# Patient Record
Sex: FEMALE | Race: OTHER | HISPANIC OR LATINO | URBAN - METROPOLITAN AREA
[De-identification: names, ages, dates, MRNs, and addresses within clinical notes are randomized per-mention and may not be internally consistent; named-entity substitution may affect disease eponyms.]

---

## 2019-06-24 ENCOUNTER — EMERGENCY (EMERGENCY)
Facility: HOSPITAL | Age: 74
LOS: 1 days | Discharge: HOME | End: 2019-06-24
Admitting: EMERGENCY MEDICINE
Payer: MEDICARE

## 2019-06-24 VITALS
TEMPERATURE: 98 F | OXYGEN SATURATION: 96 % | SYSTOLIC BLOOD PRESSURE: 78 MMHG | DIASTOLIC BLOOD PRESSURE: 48 MMHG | RESPIRATION RATE: 16 BRPM | HEART RATE: 46 BPM

## 2019-06-24 VITALS
SYSTOLIC BLOOD PRESSURE: 129 MMHG | RESPIRATION RATE: 18 BRPM | HEART RATE: 74 BPM | OXYGEN SATURATION: 96 % | DIASTOLIC BLOOD PRESSURE: 66 MMHG | TEMPERATURE: 99 F
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
## 2019-06-24 DIAGNOSIS — S42.202A UNSPECIFIED FRACTURE OF UPPER END OF LEFT HUMERUS, INITIAL ENCOUNTER FOR CLOSED FRACTURE: ICD-10-CM

## 2019-06-24 DIAGNOSIS — Z23 ENCOUNTER FOR IMMUNIZATION: ICD-10-CM
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
## 2019-06-24 DIAGNOSIS — S00.511A ABRASION OF LIP, INITIAL ENCOUNTER: ICD-10-CM
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
## 2019-06-24 DIAGNOSIS — M25.512 PAIN IN LEFT SHOULDER: ICD-10-CM
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
## 2019-06-24 LAB
ALBUMIN SERPL ELPH-MCNC: 3.9 G/DL — SIGNIFICANT CHANGE UP (ref 3.4–5)
ALP SERPL-CCNC: 60 U/L — SIGNIFICANT CHANGE UP (ref 40–120)
ALT FLD-CCNC: 18 U/L — SIGNIFICANT CHANGE UP (ref 12–42)
ANION GAP SERPL CALC-SCNC: 9 MMOL/L — SIGNIFICANT CHANGE UP (ref 9–16)
AST SERPL-CCNC: 16 U/L — SIGNIFICANT CHANGE UP (ref 15–37)
BASOPHILS NFR BLD AUTO: 0.3 % — SIGNIFICANT CHANGE UP (ref 0–2)
BILIRUB SERPL-MCNC: 0.5 MG/DL — SIGNIFICANT CHANGE UP (ref 0.2–1.2)
BUN SERPL-MCNC: 14 MG/DL — SIGNIFICANT CHANGE UP (ref 7–23)
CALCIUM SERPL-MCNC: 9.8 MG/DL — SIGNIFICANT CHANGE UP (ref 8.5–10.5)
CHLORIDE SERPL-SCNC: 106 MMOL/L — SIGNIFICANT CHANGE UP (ref 96–108)
CO2 SERPL-SCNC: 29 MMOL/L — SIGNIFICANT CHANGE UP (ref 22–31)
CREAT SERPL-MCNC: 0.77 MG/DL — SIGNIFICANT CHANGE UP (ref 0.5–1.3)
EOSINOPHIL NFR BLD AUTO: 2.3 % — SIGNIFICANT CHANGE UP (ref 0–6)
GLUCOSE SERPL-MCNC: 121 MG/DL — HIGH (ref 70–99)
HCT VFR BLD CALC: 35.8 % — SIGNIFICANT CHANGE UP (ref 34.5–45)
HGB BLD-MCNC: 11.5 G/DL — SIGNIFICANT CHANGE UP (ref 11.5–15.5)
IMM GRANULOCYTES NFR BLD AUTO: 0.2 % — SIGNIFICANT CHANGE UP (ref 0–1.5)
LYMPHOCYTES # BLD AUTO: 56.3 % — HIGH (ref 13–44)
MAGNESIUM SERPL-MCNC: 2.2 MG/DL — SIGNIFICANT CHANGE UP (ref 1.6–2.6)
MCHC RBC-ENTMCNC: 26 PG — LOW (ref 27–34)
MCHC RBC-ENTMCNC: 32.1 G/DL — SIGNIFICANT CHANGE UP (ref 32–36)
MCV RBC AUTO: 81 FL — SIGNIFICANT CHANGE UP (ref 80–100)
MONOCYTES NFR BLD AUTO: 6.4 % — SIGNIFICANT CHANGE UP (ref 2–14)
NEUTROPHILS NFR BLD AUTO: 34.5 % — LOW (ref 43–77)
PLATELET # BLD AUTO: 237 K/UL — SIGNIFICANT CHANGE UP (ref 150–400)
POTASSIUM SERPL-MCNC: 4 MMOL/L — SIGNIFICANT CHANGE UP (ref 3.5–5.3)
POTASSIUM SERPL-SCNC: 4 MMOL/L — SIGNIFICANT CHANGE UP (ref 3.5–5.3)
PROT SERPL-MCNC: 7 G/DL — SIGNIFICANT CHANGE UP (ref 6.4–8.2)
RBC # BLD: 4.42 M/UL — SIGNIFICANT CHANGE UP (ref 3.8–5.2)
RBC # FLD: 14.5 % — SIGNIFICANT CHANGE UP (ref 10.3–14.5)
SODIUM SERPL-SCNC: 144 MMOL/L — SIGNIFICANT CHANGE UP (ref 132–145)
TROPONIN I SERPL-MCNC: <0.017 NG/ML — LOW (ref 0.02–0.06)
WBC # BLD: 5.8 K/UL — SIGNIFICANT CHANGE UP (ref 3.8–10.5)
WBC # FLD AUTO: 5.8 K/UL — SIGNIFICANT CHANGE UP (ref 3.8–10.5)

## 2019-06-24 PROCEDURE — 73060 X-RAY EXAM OF HUMERUS: CPT | Mod: 26,LT

## 2019-06-24 PROCEDURE — 71045 X-RAY EXAM CHEST 1 VIEW: CPT | Mod: 26

## 2019-06-24 PROCEDURE — 93010 ELECTROCARDIOGRAM REPORT: CPT | Mod: 59

## 2019-06-24 PROCEDURE — 70450 CT HEAD/BRAIN W/O DYE: CPT | Mod: 26

## 2019-06-24 PROCEDURE — 73030 X-RAY EXAM OF SHOULDER: CPT | Mod: 26,LT

## 2019-06-24 PROCEDURE — 99284 EMERGENCY DEPT VISIT MOD MDM: CPT | Mod: 25,57

## 2019-06-24 PROCEDURE — 73200 CT UPPER EXTREMITY W/O DYE: CPT | Mod: 26,LT

## 2019-06-24 PROCEDURE — 70486 CT MAXILLOFACIAL W/O DYE: CPT | Mod: 26

## 2019-06-24 PROCEDURE — 23600 CLTX PROX HUMRL FX W/O MNPJ: CPT | Mod: 54

## 2019-06-24 RX ORDER — TETANUS TOXOID, REDUCED DIPHTHERIA TOXOID AND ACELLULAR PERTUSSIS VACCINE, ADSORBED 5; 2.5; 8; 8; 2.5 [IU]/.5ML; [IU]/.5ML; UG/.5ML; UG/.5ML; UG/.5ML
0.5 SUSPENSION INTRAMUSCULAR ONCE
Refills: 0 | Status: COMPLETED | OUTPATIENT
Start: 2019-06-24 | End: 2019-06-24

## 2019-06-24 RX ORDER — IBUPROFEN 200 MG
1 TABLET ORAL
Qty: 20 | Refills: 0
Start: 2019-06-24

## 2019-06-24 RX ORDER — IBUPROFEN 200 MG
600 TABLET ORAL ONCE
Refills: 0 | Status: COMPLETED | OUTPATIENT
Start: 2019-06-24 | End: 2019-06-24

## 2019-06-24 RX ORDER — SODIUM CHLORIDE 9 MG/ML
1000 INJECTION INTRAMUSCULAR; INTRAVENOUS; SUBCUTANEOUS ONCE
Refills: 0 | Status: COMPLETED | OUTPATIENT
Start: 2019-06-24 | End: 2019-06-24

## 2019-06-24 RX ORDER — OXYCODONE AND ACETAMINOPHEN 5; 325 MG/1; MG/1
1 TABLET ORAL ONCE
Refills: 0 | Status: DISCONTINUED | OUTPATIENT
Start: 2019-06-24 | End: 2019-06-24

## 2019-06-24 RX ORDER — MORPHINE SULFATE 50 MG/1
2 CAPSULE, EXTENDED RELEASE ORAL ONCE
Refills: 0 | Status: DISCONTINUED | OUTPATIENT
Start: 2019-06-24 | End: 2019-06-24

## 2019-06-24 RX ADMIN — OXYCODONE AND ACETAMINOPHEN 1 TABLET(S): 5; 325 TABLET ORAL at 20:25

## 2019-06-24 RX ADMIN — MORPHINE SULFATE 2 MILLIGRAM(S): 50 CAPSULE, EXTENDED RELEASE ORAL at 14:56

## 2019-06-24 RX ADMIN — Medication 600 MILLIGRAM(S): at 20:26

## 2019-06-24 RX ADMIN — SODIUM CHLORIDE 1000 MILLILITER(S): 9 INJECTION INTRAMUSCULAR; INTRAVENOUS; SUBCUTANEOUS at 14:13

## 2019-06-24 RX ADMIN — MORPHINE SULFATE 2 MILLIGRAM(S): 50 CAPSULE, EXTENDED RELEASE ORAL at 20:26

## 2019-06-24 RX ADMIN — Medication 600 MILLIGRAM(S): at 20:25

## 2019-06-24 RX ADMIN — TETANUS TOXOID, REDUCED DIPHTHERIA TOXOID AND ACELLULAR PERTUSSIS VACCINE, ADSORBED 0.5 MILLILITER(S): 5; 2.5; 8; 8; 2.5 SUSPENSION INTRAMUSCULAR at 14:13

## 2019-06-24 RX ADMIN — OXYCODONE AND ACETAMINOPHEN 1 TABLET(S): 5; 325 TABLET ORAL at 20:26

## 2019-06-24 NOTE — ED PROVIDER NOTE - PHYSICAL EXAMINATION
VITAL SIGNS: I have reviewed nursing notes and confirm.  CONSTITUTIONAL: Well-developed; well-nourished; in no acute distress.  SKIN: Skin is warm and dry, no acute rash.  HEAD: Normocephalic; atraumatic.  EYES: PERRL, EOM intact; conjunctiva and sclera clear.  ENT: No nasal discharge; airway clear.  NECK: Supple; non tender.  CARD: S1, S2 normal; no murmurs, gallops, or rubs. Regular rate and rhythm.  RESP: No wheezes, rales or rhonchi.  ABD: Normal bowel sounds; soft; non-distended; non-tender; no hepatosplenomegaly.  EXT: +Swelling to left shoulder. Unable to range shoulder. Soft compartments. No elbow, wrist, or forearm tenderness. Radial pulses 2+.   NEURO: Alert, oriented. Grossly unremarkable.  PSYCH: Cooperative, appropriate. VITAL SIGNS: I have reviewed nursing notes and confirm.  CONSTITUTIONAL: Well-developed; well-nourished; in no acute distress.  SKIN: Skin is warm and dry, no acute rash.  HEAD: Normocephalic; atraumatic.  FacE: +abrasions to upper lip  EYES: PERRL, EOM intact; conjunctiva and sclera clear.  ENT: No nasal discharge; airway clear.  NECK: Supple; non tender. no midline tenderness   CARD: S1, S2 normal; no murmurs, gallops, or rubs. Regular rate and rhythm.  RESP: No wheezes, rales or rhonchi.  ABD: Normal bowel sounds; soft; non-distended; non-tender  EXT: LUE: +Swelling to left shoulder. Unable to range shoulder. Soft compartments. No elbow, wrist, or forearm tenderness. Radial pulses 2+.   Ambulatory  NEURO: Alert, oriented. Grossly unremarkable.  PSYCH: Cooperative, appropriate.

## 2019-06-24 NOTE — ED PROVIDER NOTE - CARE PROVIDER_API CALL
Deondre Bailon)  Orthopaedic Surgery  [address] [place] [address]  Phone: [phone]  Fax: [phone]  Follow Up Time:

## 2019-06-24 NOTE — ED PROVIDER NOTE - CLINICAL SUMMARY MEDICAL DECISION MAKING FREE TEXT BOX
s/p mechanical fall, c/o left shoulder pain. arrived to ED c/o nausea, dizziness, HR 50s, BP systolic 90s, probable vagal tone from pain, improved. xrays show proximal humerus fracture, no dislocation, nvi, closed fracture. spoke with ortho on call dr. marcial and discussed case, he requested CT shoulder be obtained and will have his team reach out to patient for possible surgical repair, discussed this with patient/daughter, they agree with plan, will d/c home with sling, pain meds, f/u ortho, return precautions discussed.

## 2019-06-24 NOTE — ED ADULT NURSE NOTE - OBJECTIVE STATEMENT
Pt walked into ED c/o left shoulder pain and lightheadedness s/p mechanical fall x30min PTA. Pt reports she tripped over her own foot and landed face first, no LOC, no use of blood thinners. Pt presents with abrasion to the top lip, last tdap unknown, incontinent of urine, limited ROM to the LUE due to pain.

## 2019-06-24 NOTE — ED ADULT TRIAGE NOTE - CHIEF COMPLAINT QUOTE
Mechanical trip and fall on face. Denies LOC. Reports left shoulder pain. Abrasion to right side of nose. Denies blood thinners.

## 2019-06-24 NOTE — ED PROVIDER NOTE - NSFOLLOWUPINSTRUCTIONS_ED_ALL_ED_FT
You have a left shoulder fracture that may need surgery  Please follow up with Dr. Bailon within 2-3 days  Wear sling.   Take pain medications as prescribed  Take Motrin with food  Percocet can make you drowsy, sleepy and also constipated - increase fiber intake.     RETURN TO THE EMERGENCY DEPARTMENT FOR WORSENING PAIN, NUMBNESS, WEAKNESS, OR ANY CONCERNING OR WORSENING SYMPTOMS.

## 2019-06-24 NOTE — ED ADULT NURSE NOTE - NSIMPLEMENTINTERV_GEN_ALL_ED
Implemented All Fall Risk Interventions:  [facility] to call system. Call bell, personal items and telephone within reach. Instruct patient to call for assistance. Room bathroom lighting operational. Non-slip footwear when patient is off stretcher. Physically safe environment: no spills, clutter or unnecessary equipment. Stretcher in lowest position, wheels locked, appropriate side rails in place. Provide visual cue, wrist band, yellow gown, etc. Monitor gait and stability. Monitor for mental status changes and reorient to person, place, and time. Review medications for side effects contributing to fall risk. Reinforce activity limits and safety measures with patient and family.

## 2019-06-24 NOTE — ED PROVIDER NOTE - OBJECTIVE STATEMENT
72 y/o female with PMHx of Parkinson, accompanied by friend, states that she tripped and fell outside in the street and fell onto her left shoulder. She is c/o left shoulder pain. Unable to move left shoulder. She is also c/o bruising to upper lip. No LOC. No nausea, vomiting, headache, neck pain, back pain. Tetanus unknown. 74 y/o female with PMHx of Parkinsons, accompanied by friend, s/p tripped and fell outside in the street and fell onto her left shoulder. c/o left shoulder pain. Unable to move left shoulder. c/o bruising to upper lip. No LOC. No nausea, vomiting, headache, neck pain, back pain. Tetanus unknown.     Patient walked to ED accompanied by friend - upon arrival to ED, she is c/o dizziness/lightheadedness, BP 90s systolic, HR 50s.

## 2019-06-24 NOTE — ED ADULT NURSE NOTE - NSFALLRSKPASTHIST_ED_ALL_ED
[>50% of Time Spent on Counseling for ____] : Greater than 50% of the encounter time was spent on counseling for [unfilled] [Time Spent: ___ minutes] : I have spent [unfilled] minutes of face to face time with the patient yes

## 2019-06-24 NOTE — ED PROVIDER NOTE - DIAGNOSTIC INTERPRETATION
Interpreted by ED STEPHANIE Anand  chest x-ray 1 view  Lungs clear, heart shadow normal, bony structures normal, no free air under diaphragm, no PTX   left shoulder x-ray 3 views  proximal humeral fracture, no dislocation (joint spaces grossly normal), no Foreign Body noted, soft tissue swelling  left humerus xrays 2 views  humerus fracture, no dislocation (joint spaces grossly normal), no Foreign Body noted, soft tissue swelling

## 2019-06-26 PROBLEM — G20 PARKINSON'S DISEASE: Chronic | Status: ACTIVE | Noted: 2019-06-24

## 2019-06-27 ENCOUNTER — INPATIENT (INPATIENT)
Facility: HOSPITAL | Age: 74
LOS: 0 days | Discharge: HOME | DRG: 494 | End: 2019-06-28
Attending: ORTHOPAEDIC SURGERY | Admitting: ORTHOPAEDIC SURGERY
Payer: COMMERCIAL

## 2019-06-27 ENCOUNTER — OUTPATIENT (OUTPATIENT)
Dept: OUTPATIENT SERVICES | Facility: HOSPITAL | Age: 74
LOS: 1 days | Discharge: HOME | End: 2019-06-27

## 2019-06-27 VITALS
TEMPERATURE: 98 F | SYSTOLIC BLOOD PRESSURE: 105 MMHG | DIASTOLIC BLOOD PRESSURE: 55 MMHG | RESPIRATION RATE: 14 BRPM | HEART RATE: 76 BPM | OXYGEN SATURATION: 99 %

## 2019-06-27 DIAGNOSIS — K80.10 CALCULUS OF GALLBLADDER WITH CHRONIC CHOLECYSTITIS WITHOUT OBSTRUCTION: Chronic | ICD-10-CM

## 2019-06-27 DIAGNOSIS — G20 PARKINSON'S DISEASE: ICD-10-CM

## 2019-06-27 DIAGNOSIS — Z98.890 OTHER SPECIFIED POSTPROCEDURAL STATES: Chronic | ICD-10-CM

## 2019-06-27 DIAGNOSIS — S42.209A UNSPECIFIED FRACTURE OF UPPER END OF UNSPECIFIED HUMERUS, INITIAL ENCOUNTER FOR CLOSED FRACTURE: ICD-10-CM

## 2019-06-27 DIAGNOSIS — Z90.710 ACQUIRED ABSENCE OF BOTH CERVIX AND UTERUS: Chronic | ICD-10-CM

## 2019-06-27 DIAGNOSIS — S42.232A 3-PART FRACTURE OF SURGICAL NECK OF LEFT HUMERUS, INITIAL ENCOUNTER FOR CLOSED FRACTURE: ICD-10-CM

## 2019-06-27 DIAGNOSIS — Z79.1 LONG TERM (CURRENT) USE OF NON-STEROIDAL ANTI-INFLAMMATORIES (NSAID): ICD-10-CM

## 2019-06-27 DIAGNOSIS — Z79.891 LONG TERM (CURRENT) USE OF OPIATE ANALGESIC: ICD-10-CM
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
## 2019-06-27 RX ORDER — ASPIRIN/CALCIUM CARB/MAGNESIUM 324 MG
325 TABLET ORAL DAILY
Refills: 0 | Status: DISCONTINUED | OUTPATIENT
Start: 2019-06-27 | End: 2019-06-28

## 2019-06-27 RX ORDER — MORPHINE SULFATE 50 MG/1
4 CAPSULE, EXTENDED RELEASE ORAL EVERY 4 HOURS
Refills: 0 | Status: DISCONTINUED | OUTPATIENT
Start: 2019-06-27 | End: 2019-06-28

## 2019-06-27 RX ORDER — OXYCODONE HYDROCHLORIDE 5 MG/1
5 TABLET ORAL EVERY 6 HOURS
Refills: 0 | Status: DISCONTINUED | OUTPATIENT
Start: 2019-06-27 | End: 2019-06-28

## 2019-06-27 RX ORDER — CEFAZOLIN SODIUM 1 G
2000 VIAL (EA) INJECTION EVERY 8 HOURS
Refills: 0 | Status: COMPLETED | OUTPATIENT
Start: 2019-06-28 | End: 2019-06-28

## 2019-06-27 RX ORDER — ONDANSETRON 8 MG/1
4 TABLET, FILM COATED ORAL EVERY 4 HOURS
Refills: 0 | Status: DISCONTINUED | OUTPATIENT
Start: 2019-06-27 | End: 2019-06-28

## 2019-06-27 RX ORDER — SODIUM CHLORIDE 9 MG/ML
1000 INJECTION, SOLUTION INTRAVENOUS
Refills: 0 | Status: DISCONTINUED | OUTPATIENT
Start: 2019-06-27 | End: 2019-06-28

## 2019-06-27 RX ORDER — DOCUSATE SODIUM 100 MG
100 CAPSULE ORAL THREE TIMES A DAY
Refills: 0 | Status: DISCONTINUED | OUTPATIENT
Start: 2019-06-27 | End: 2019-06-28

## 2019-06-27 RX ORDER — MORPHINE SULFATE 50 MG/1
4 CAPSULE, EXTENDED RELEASE ORAL
Refills: 0 | Status: DISCONTINUED | OUTPATIENT
Start: 2019-06-27 | End: 2019-06-28

## 2019-06-27 RX ADMIN — Medication 100 MILLIGRAM(S): at 22:35

## 2019-06-27 RX ADMIN — SODIUM CHLORIDE 125 MILLILITER(S): 9 INJECTION, SOLUTION INTRAVENOUS at 21:21

## 2019-06-27 NOTE — H&P ADULT - NSHPPHYSICALEXAM_GEN_ALL_CORE
MSK:  decreased range of motion left shoulder 2/2 pain  Remainder of physical exam as per medical clearance note NAD  MSK Left UE:  decreased range of motion left shoulder 2/2 pain  +ecchymosis left UE  radial palpbale  neuromotor intact distally, RAD/MED/PIN/AIN intact  gross sensation intact to light touch b/l UE  +tremor left UE  able to extend left wrist  Remainder of physical exam as per medical clearance note

## 2019-06-27 NOTE — PACU DISCHARGE NOTE - COMMENTS
Patient discharged from PACU after meeting criteia. Report given to 8W CATE Vega. VSS. RRWNL on 3L NC. O2 sats 98%. CM showing SR. Right hand HL 18g with LR at 125cc/h. Abd benign. Pt due to void. Pt tolerating sips of water with meds. Pt transported on bed by transport team. Pt sent on bed with 1 bag of belongings and container with teeth. Pt pain free

## 2019-06-27 NOTE — H&P ADULT - PROBLEM SELECTOR PLAN 1
Admit to Orthopaedic Service.  Presents today for elective left humerus ORIF   Pt medically stable and cleared for procedure today by  Admit to Orthopaedic Service.  Presents today for elective left humerus ORIF

## 2019-06-27 NOTE — H&P ADULT - HISTORY OF PRESENT ILLNESS
73 year old female presents with left shoulder pain x   presents today for elective left humerus ORIF 73 year old female presents with left shoulder pain x few days. S/p mechanical fall - sustained left humerus fracture, seen at [facility]. No head trauma or LOC.    Presents today for elective left humerus ORIF.  right hand dominant. No numbness of Left UE. NO fever, chills, or recent illness. No CP or SOB.   Hx parkinson's with UE remor

## 2019-06-28 ENCOUNTER — TRANSCRIPTION ENCOUNTER (OUTPATIENT)
Age: 74
End: 2019-06-28

## 2019-06-28 VITALS
OXYGEN SATURATION: 98 % | SYSTOLIC BLOOD PRESSURE: 116 MMHG | HEART RATE: 74 BPM | RESPIRATION RATE: 16 BRPM | DIASTOLIC BLOOD PRESSURE: 68 MMHG | TEMPERATURE: 98 F

## 2019-06-28 RX ORDER — ASPIRIN/CALCIUM CARB/MAGNESIUM 324 MG
1 TABLET ORAL
Qty: 30 | Refills: 0
Start: 2019-06-28 | End: 2019-07-27

## 2019-06-28 RX ORDER — ACETAMINOPHEN 500 MG
1 TABLET ORAL
Qty: 90 | Refills: 0
Start: 2019-06-28 | End: 2019-07-27

## 2019-06-28 RX ORDER — OXYCODONE HYDROCHLORIDE 5 MG/1
10 TABLET ORAL EVERY 4 HOURS
Refills: 0 | Status: DISCONTINUED | OUTPATIENT
Start: 2019-06-28 | End: 2019-06-28

## 2019-06-28 RX ORDER — OXYCODONE HYDROCHLORIDE 5 MG/1
5 TABLET ORAL EVERY 4 HOURS
Refills: 0 | Status: DISCONTINUED | OUTPATIENT
Start: 2019-06-28 | End: 2019-06-28

## 2019-06-28 RX ORDER — OXYCODONE HYDROCHLORIDE 5 MG/1
1 TABLET ORAL
Qty: 10 | Refills: 0
Start: 2019-06-28

## 2019-06-28 RX ORDER — ACETAMINOPHEN 500 MG
975 TABLET ORAL EVERY 8 HOURS
Refills: 0 | Status: DISCONTINUED | OUTPATIENT
Start: 2019-06-28 | End: 2019-06-28

## 2019-06-28 RX ADMIN — OXYCODONE HYDROCHLORIDE 5 MILLIGRAM(S): 5 TABLET ORAL at 04:12

## 2019-06-28 RX ADMIN — Medication 2000 MILLIGRAM(S): at 07:08

## 2019-06-28 RX ADMIN — Medication 2000 MILLIGRAM(S): at 00:51

## 2019-06-28 RX ADMIN — OXYCODONE HYDROCHLORIDE 5 MILLIGRAM(S): 5 TABLET ORAL at 04:47

## 2019-06-28 RX ADMIN — Medication 100 MILLIGRAM(S): at 07:08

## 2019-06-28 NOTE — DISCHARGE NOTE PROVIDER - NSDCCPCAREPLAN_GEN_ALL_CORE_FT
PRINCIPAL DISCHARGE DIAGNOSIS  Diagnosis: Fracture of proximal humerus  Assessment and Plan of Treatment: improvement s/p Left proximal humerus ORIF

## 2019-06-28 NOTE — DISCHARGE NOTE PROVIDER - NSDCFUADDINST_GEN_ALL_CORE_FT
NON weight bearing Left arm with cane.  Wear sling at all times except to change clothes and shower. Keep arm at side.  No strenuous activity, heavy lifting, driving or returning to work until cleared by MD.  You may shower - dressing is water-resistant, no soaking in bathtubs.  Remove dressing after post op day 5-7, then leave incision open to air. Keep incision clean and dry.  Try to have regular bowel movements, take stool softener or laxative if necessary.  May take Pepcid or Zantac for upset stomach.  Swelling may travel all the way down arm to hand, this is normal and will subside in a few weeks.  Call to schedule an appt with Dr. Bailon for follow up, if you have staples or sutures they will be removed in office.  Contact your doctor if you experience: fever greater than 101.5, chills, chest pain, difficulty breathing, redness or excessive drainage around the incision, other concerns.  Follow up with your primary care provider.

## 2019-06-28 NOTE — OCCUPATIONAL THERAPY INITIAL EVALUATION ADULT - ADDITIONAL COMMENTS
Pt reports she lives with her daughter who helps take care of her. Daughter is currently on vacation and adopted son will help while she is away. Pt has baseline Parkinson's and owns a SC.

## 2019-06-28 NOTE — PROGRESS NOTE ADULT - SUBJECTIVE AND OBJECTIVE BOX
Orthopaedics Post Op Check    Procedure:L humerus ORIF  Surgeon: Adamaris    Pt comfortable, without complaints  Denies CP, SOB, N/V, numbness/tingling     Vital Signs Last 24 Hrs  T(C): 36.7 (27 Jun 2019 22:55), Max: 37.2 (27 Jun 2019 13:32)  T(F): 98 (27 Jun 2019 22:55), Max: 98.9 (27 Jun 2019 13:32)  HR: 76 (27 Jun 2019 22:55) (72 - 79)  BP: 103/57 (27 Jun 2019 22:55) (95/49 - 122/73)  BP(mean): 76 (27 Jun 2019 22:55) (64 - 76)  RR: 17 (27 Jun 2019 22:55) (13 - 18)  SpO2: 96% (27 Jun 2019 22:55) (96% - 99%)      AVSS, NAD  General: Pt Alert and oriented       LUE  Dressing: C/D/I  Motor: wiggles fingers, flex/extends wrist  Sensation: SILT med/uln/rad/musc/axillary   Pulses:  rad/brach 2+ , fingers/hand WWP            Post op XR: Well positioned components    A/P: 73yFemale POD#0 s/p above procedure, doing well  - Stable  - Pain Control  - DVT ppx:  BID / SCDs  - Paula op abx: ancef  - PT, WBS: nwb lue  - F/U AM Labs

## 2019-06-28 NOTE — DISCHARGE NOTE PROVIDER - CARE PROVIDER_API CALL
Deondre Bailon)  Orthopaedic Surgery; Sports Medicine  [address] [place] [address]  Phone: [phone]  Fax: [phone]  Follow Up Time: 2 weeks

## 2019-06-28 NOTE — DISCHARGE NOTE PROVIDER - HOSPITAL COURSE
Admitted    Surgery 6/28 Left proximal humerus fx    Paula-op Antibiotics    Pain control    DVT prophylaxis    OOB/Occupational Therapy

## 2019-06-28 NOTE — OCCUPATIONAL THERAPY INITIAL EVALUATION ADULT - RANGE OF MOTION EXAMINATION, UPPER EXTREMITY
LUE not tested 2/2 to NWB; R digits/wrist full ROM/Right UE Active ROM was WFL (within functional limits)

## 2019-06-28 NOTE — DISCHARGE NOTE PROVIDER - NSDCACTIVITY_GEN_ALL_CORE
Do not drive or operate machinery/No heavy lifting/straining/Walking - Outdoors allowed/Walking - Indoors allowed/Stairs allowed/Showering allowed

## 2019-06-28 NOTE — DISCHARGE NOTE NURSING/CASE MANAGEMENT/SOCIAL WORK - NSDCDPATPORTLINK_GEN_ALL_CORE
You can access the [facility] Patient Portal, offered by [facility], by registering with the following website: [email or web address]

## 2019-06-28 NOTE — PROGRESS NOTE ADULT - SUBJECTIVE AND OBJECTIVE BOX
SUBJECTIVE: Patient seen and examined. Pain controlled.  Pt did well o/n  No f/c/n/v/cp/sob.     OBJECTIVE:  NAD  Vital Signs Last 24 Hrs  T(C): 35.7 (28 Jun 2019 00:10), Max: 37.2 (27 Jun 2019 13:32)  T(F): 96.3 (28 Jun 2019 00:10), Max: 98.9 (27 Jun 2019 13:32)  HR: 78 (28 Jun 2019 00:10) (72 - 79)  BP: 114/66 (28 Jun 2019 00:10) (95/49 - 122/73)  BP(mean): 76 (27 Jun 2019 22:55) (64 - 76)  RR: 16 (28 Jun 2019 00:10) (13 - 18)  SpO2: 97% (28 Jun 2019 00:10) (96% - 99%)    Affected extremity:          Dressing: clean/dry/intact           Motor: wiggles fingers, flex/extends wrist        Sensation: SILT med/uln/rad/musc/axillary         Pulses:  rad/brach 2+ , fingers/hand WWP          I&O's Detail    27 Jun 2019 07:01  -  28 Jun 2019 06:05  --------------------------------------------------------  IN:    lactated ringers.: 1250 mL    Oral Fluid: 60 mL  Total IN: 1310 mL    OUT:    Estimated Blood Loss: 150 mL    Voided: 300 mL  Total OUT: 450 mL    Total NET: 860 mL        A/P :  Pt is a 74yo Female s/p  L ORIF prox humerus 6/27  -    Pain control  -    DVT ppx: SCD/ASA    -    Weight bearing status: NWB LUE   -    Dispo: Home    Cristhian Harris MD  PGY -4  Orthopaedic Surgery

## 2019-07-01 PROCEDURE — 97161 PT EVAL LOW COMPLEX 20 MIN: CPT

## 2019-07-01 PROCEDURE — 84484 ASSAY OF TROPONIN QUANT: CPT

## 2019-07-01 PROCEDURE — 73060 X-RAY EXAM OF HUMERUS: CPT

## 2019-07-01 PROCEDURE — C1713: CPT

## 2019-07-01 PROCEDURE — 70450 CT HEAD/BRAIN W/O DYE: CPT

## 2019-07-01 PROCEDURE — 70486 CT MAXILLOFACIAL W/O DYE: CPT

## 2019-07-01 PROCEDURE — 83735 ASSAY OF MAGNESIUM: CPT

## 2019-07-01 PROCEDURE — 90715 TDAP VACCINE 7 YRS/> IM: CPT

## 2019-07-01 PROCEDURE — 73030 X-RAY EXAM OF SHOULDER: CPT

## 2019-07-01 PROCEDURE — 76000 FLUOROSCOPY <1 HR PHYS/QHP: CPT

## 2019-07-01 PROCEDURE — 36415 COLL VENOUS BLD VENIPUNCTURE: CPT

## 2019-07-01 PROCEDURE — 23615 OPTX PROX HUMRL FX W/INT FIX: CPT

## 2019-07-01 PROCEDURE — 73200 CT UPPER EXTREMITY W/O DYE: CPT

## 2019-07-01 PROCEDURE — 82962 GLUCOSE BLOOD TEST: CPT

## 2019-07-01 PROCEDURE — C1889: CPT

## 2019-07-01 PROCEDURE — 71045 X-RAY EXAM CHEST 1 VIEW: CPT

## 2019-07-01 PROCEDURE — 85025 COMPLETE CBC W/AUTO DIFF WBC: CPT

## 2019-07-01 PROCEDURE — 80053 COMPREHEN METABOLIC PANEL: CPT
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
## 2019-07-09 DIAGNOSIS — S42.212A UNSPECIFIED DISPLACED FRACTURE OF SURGICAL NECK OF LEFT HUMERUS, INITIAL ENCOUNTER FOR CLOSED FRACTURE: ICD-10-CM
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
## 2019-09-25 ENCOUNTER — APPOINTMENT (OUTPATIENT)
Dept: RADIOLOGY | Facility: CLINIC | Age: 74
End: 2019-09-25

## 2019-09-25 ENCOUNTER — OUTPATIENT (OUTPATIENT)
Dept: OUTPATIENT SERVICES | Facility: HOSPITAL | Age: 74
LOS: 1 days | End: 2019-09-25
Payer: MEDICARE

## 2019-09-25 DIAGNOSIS — Z90.710 ACQUIRED ABSENCE OF BOTH CERVIX AND UTERUS: Chronic | ICD-10-CM

## 2019-09-25 DIAGNOSIS — Z98.890 OTHER SPECIFIED POSTPROCEDURAL STATES: Chronic | ICD-10-CM

## 2019-09-25 DIAGNOSIS — K80.10 CALCULUS OF GALLBLADDER WITH CHRONIC CHOLECYSTITIS WITHOUT OBSTRUCTION: Chronic | ICD-10-CM

## 2019-09-25 PROBLEM — F41.9 ANXIETY DISORDER, UNSPECIFIED: Chronic | Status: ACTIVE | Noted: 2019-06-27

## 2019-09-25 PROCEDURE — 73030 X-RAY EXAM OF SHOULDER: CPT

## 2019-09-25 PROCEDURE — 73030 X-RAY EXAM OF SHOULDER: CPT | Mod: 26,LT

## 2020-09-02 ENCOUNTER — OUTPATIENT (OUTPATIENT)
Dept: OUTPATIENT SERVICES | Facility: HOSPITAL | Age: 75
LOS: 1 days | End: 2020-09-02

## 2020-09-02 ENCOUNTER — APPOINTMENT (OUTPATIENT)
Dept: RADIOLOGY | Facility: CLINIC | Age: 75
End: 2020-09-02
Payer: MEDICARE

## 2020-09-02 DIAGNOSIS — Z98.890 OTHER SPECIFIED POSTPROCEDURAL STATES: Chronic | ICD-10-CM

## 2020-09-02 DIAGNOSIS — Z90.710 ACQUIRED ABSENCE OF BOTH CERVIX AND UTERUS: Chronic | ICD-10-CM

## 2020-09-02 DIAGNOSIS — K80.10 CALCULUS OF GALLBLADDER WITH CHRONIC CHOLECYSTITIS WITHOUT OBSTRUCTION: Chronic | ICD-10-CM

## 2020-09-02 PROCEDURE — 73030 X-RAY EXAM OF SHOULDER: CPT | Mod: 26,LT

## 2020-09-15 PROBLEM — Z00.00 ENCOUNTER FOR PREVENTIVE HEALTH EXAMINATION: Status: ACTIVE | Noted: 2020-09-15

## 2020-09-21 ENCOUNTER — APPOINTMENT (OUTPATIENT)
Dept: PHYSICAL MEDICINE AND REHAB | Facility: CLINIC | Age: 75
End: 2020-09-21
Payer: MEDICARE

## 2020-09-21 VITALS — WEIGHT: 117 LBS | RESPIRATION RATE: 16 BRPM | OXYGEN SATURATION: 97 %

## 2020-09-21 DIAGNOSIS — M75.02 ADHESIVE CAPSULITIS OF LEFT SHOULDER: ICD-10-CM

## 2020-09-21 DIAGNOSIS — Z87.39 PERSONAL HISTORY OF OTHER DISEASES OF THE MUSCULOSKELETAL SYSTEM AND CONNECTIVE TISSUE: ICD-10-CM

## 2020-09-21 DIAGNOSIS — Z86.69 PERSONAL HISTORY OF OTHER DISEASES OF THE NERVOUS SYSTEM AND SENSE ORGANS: ICD-10-CM

## 2020-09-21 PROCEDURE — 99203 OFFICE O/P NEW LOW 30 MIN: CPT | Mod: 25

## 2020-09-21 PROCEDURE — 20611 DRAIN/INJ JOINT/BURSA W/US: CPT | Mod: LT

## 2020-09-24 PROBLEM — Z87.39 HISTORY OF ARTHRITIS: Status: RESOLVED | Noted: 2020-09-21 | Resolved: 2020-09-24

## 2020-09-24 PROBLEM — Z86.69 HISTORY OF PARKINSON'S DISEASE: Status: RESOLVED | Noted: 2020-09-21 | Resolved: 2020-09-24

## 2020-09-24 PROBLEM — M75.02 ADHESIVE CAPSULITIS OF LEFT SHOULDER: Status: ACTIVE | Noted: 2020-09-24

## 2022-01-19 ENCOUNTER — OUTPATIENT (OUTPATIENT)
Dept: OUTPATIENT SERVICES | Facility: HOSPITAL | Age: 77
LOS: 1 days | End: 2022-01-19
Payer: MEDICARE

## 2022-01-19 ENCOUNTER — APPOINTMENT (OUTPATIENT)
Dept: RADIOLOGY | Facility: CLINIC | Age: 77
End: 2022-01-19

## 2022-01-19 DIAGNOSIS — Z90.710 ACQUIRED ABSENCE OF BOTH CERVIX AND UTERUS: Chronic | ICD-10-CM

## 2022-01-19 DIAGNOSIS — K80.10 CALCULUS OF GALLBLADDER WITH CHRONIC CHOLECYSTITIS WITHOUT OBSTRUCTION: Chronic | ICD-10-CM

## 2022-01-19 DIAGNOSIS — Z98.890 OTHER SPECIFIED POSTPROCEDURAL STATES: Chronic | ICD-10-CM

## 2022-01-19 PROCEDURE — 73030 X-RAY EXAM OF SHOULDER: CPT | Mod: 26,LT

## 2022-04-01 ENCOUNTER — APPOINTMENT (OUTPATIENT)
Dept: PHYSICAL MEDICINE AND REHAB | Facility: CLINIC | Age: 77
End: 2022-04-01
Payer: MEDICARE

## 2022-04-01 VITALS — HEIGHT: 62 IN | BODY MASS INDEX: 20.24 KG/M2 | WEIGHT: 110 LBS

## 2022-04-01 DIAGNOSIS — M19.012 PRIMARY OSTEOARTHRITIS, LEFT SHOULDER: ICD-10-CM

## 2022-04-01 PROCEDURE — 99214 OFFICE O/P EST MOD 30 MIN: CPT

## 2022-04-01 NOTE — ASSESSMENT
[FreeTextEntry1] : Impression:\par 1. Left Glenohumeral DJD s/p ORIF\par \par Plan: After review of the history, physical examination, and imaging, the patient's symptoms are consistent with Progressive DJD of the shoulder. The imaging results and diagnosis were reviewed with the patient . We discussed all the potential treatment options including physical therapy, oral medication, ultrasound guided injections, and surgery; as well as alternative therapeutics such as acupuncture and massage. At this time the patient has been doing PT without any improvement. She is interested in repeat interventional options for symptom reduction; I offered [facility] CSI in the office today, please see procedure note for full detail. I am recommending that the patient proceed with gentle HEP alone and stop PT. We discussed the possibility of TSA in the future and she will consider this pending her response to CSI. The patient will return to the office for followup in 3-4 months. The patient expressed verbal understanding and is in agreement with the plan of care. All of the patient's questions and concerns were addressed during today's visit.

## 2022-04-01 NOTE — DATA REVIEWED
[FreeTextEntry1] : XR L Shoulder 1/2022: S/P ORIF of left proximal humerus. Moderate glenohumeral degenerative changes.

## 2022-04-01 NOTE — HISTORY OF PRESENT ILLNESS
[FreeTextEntry1] : Ms. THERESE HOLMAN is a very pleasant 75 year female who comes in for follow up of Left shoulder pain. She did well with CSI at her previous visit, and has been doing PT recently without any improvement. The pain is located primarily on her left shoulder intermittent in nature and described as consistent and achy. The pain is rated as 3/10 during today's visit, and ranges from 2-7/10. The patient's symptoms are aggravated by lifting her arm and alleviated by not lifting. The patient denies any night pain, numbness/tingling, weakness, or bowel/bladder dysfunction. The patient has no other complaints at this time.

## 2022-04-01 NOTE — PHYSICAL EXAM
[FreeTextEntry1] : GEN: AAOx3, NAD.\par GAIT: Non antalgic, normal reciprocating heel to toe.\par INSP: (+) Atrophy, No Bony Deformity, Swelling. Shoulder height symmetric. Normal Thoracic Kyphosis.\par Cervical ROM: Full and pain free.\par Shoulder AROM: Limited ABd/Flex/IR/ER Left. No crepitus. PROM IR/ER limited Left.\par PALP: No tenderness anterior, lateral, or posterior compartments.\par STRENGTH: 5/5 bilateral shoulder abductors, internal rotation, external rotation, elbow flexors, elbow extensors, wrist extensors, long finger flexors, hand intrinsics, .\par TONE: Normal, No clonus.\par SENSATION:  Normal to light touch in the bilateral upper extremities.\par REFLEXES: Symmetric biceps, triceps, brachioradialis, pronator teres. Palmer negative bilaterally.\par SPECIAL: Spurling's negative bilaterally.

## 2022-04-01 NOTE — PROCEDURE
[de-identified] : Procedure: Intra-articular LEFT Glenohumeral Injection with Ultrasound Guidance\par \par Findings: The LEFT shoulder was examined under ultrasound using a 10mHz linear array transducer. There was evidence of mild effusion.\par \par Injectate: 1mL Kenalog (40mg/mL) with 1mL 0.25% Bupivicaine and 2mL Lidocaine 1% without Epinephrine\par \par After risks, benefits and alternatives were discussed and the patient expressed understanding, informed consent was obtained. Risks include but are not limited to bleeding, infection, worsening pain, nerve damage, scar formation. \par \par Ultrasound was indicated for needle guidance, to ensure needle placement, and to assess for any effusions or vasculature in the path of the needle.\par \par The LEFT glenohumeral joint was identified by ultrasound, and probe location was marked on the skin. Ultrasound findings are noted above.  The overlying area was prepped and draped in a sterile fashion with Chloraprep.  After skin preparation, a 25 gauge needle was used to anesthetize the skin with 2 mL of 1% Lidocaine. There was no blood in the hub on aspiration prior to any medication delivery. Then a 22 gauge 1.5” needle was inserted into the glenohumeral joint under direct ultrasound guidance, using a posterior in-plane lateral to medial approach. Aspiration was negative for blood, and the above injectate was placed. A good capsular distension pattern was noted confirming intra-articular placement. Ultrasound images for needle placement verification were permanently stored for reference.\par \par At the conclusion of the procedure the needle was removed, the area was cleaned with alcohol and a band-aid was placed over the injection site. The patient tolerated the procedure well. There were no complications during or after the procedure. The patient reported improvement in symptoms following the procedure. Post-procedure care instructions and follow up appointment were given. If there are any complications, the patient was instructed to call the office.

## 2024-04-08 NOTE — DISCHARGE NOTE PROVIDER - NSDCDCMDCOMP_GEN_ALL_CORE
Quality 130: Documentation Of Current Medications In The Medical Record: Current Medications Documented
Quality 431: Preventive Care And Screening: Unhealthy Alcohol Use - Screening: Patient not identified as an unhealthy alcohol user when screened for unhealthy alcohol use using a systematic screening method
Detail Level: Detailed
Quality 226: Preventive Care And Screening: Tobacco Use: Screening And Cessation Intervention: Patient screened for tobacco use and is an ex/non-smoker
This document is complete and the patient is ready for discharge.